# Patient Record
Sex: FEMALE | Race: WHITE
[De-identification: names, ages, dates, MRNs, and addresses within clinical notes are randomized per-mention and may not be internally consistent; named-entity substitution may affect disease eponyms.]

---

## 2018-10-27 ENCOUNTER — HOSPITAL ENCOUNTER (EMERGENCY)
Dept: HOSPITAL 62 - ER | Age: 28
LOS: 1 days | Discharge: HOME | End: 2018-10-28
Payer: SELF-PAY

## 2018-10-27 VITALS — SYSTOLIC BLOOD PRESSURE: 127 MMHG | DIASTOLIC BLOOD PRESSURE: 83 MMHG

## 2018-10-27 DIAGNOSIS — Z71.6: ICD-10-CM

## 2018-10-27 DIAGNOSIS — S92.352A: Primary | ICD-10-CM

## 2018-10-27 DIAGNOSIS — F17.210: ICD-10-CM
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
## 2018-10-27 PROCEDURE — 99406 BEHAV CHNG SMOKING 3-10 MIN: CPT

## 2018-10-27 PROCEDURE — 99283 EMERGENCY DEPT VISIT LOW MDM: CPT

## 2018-10-27 NOTE — ER DOCUMENT REPORT
ED Extremity Problem, Lower





- General


Chief Complaint: Ankle Injury


Stated Complaint: FOOT PAIN


Time Seen by Provider: 10/27/18 22:27


Mode of Arrival: Ambulatory


Information source: Patient


Notes: 





28-year-old female presents to ED for complaint of left foot pain.  She states 

she does not know exactly the day but about 1 or 2 months ago she rolled her 

foot.  She states she rolled her feet a lot but the pain is been for about 1 or 

2 months this time.  She states the pain is in the lateral aspect of her foot 

as well as the bottom of the great toe.  She states for the last 2 days the 

pain has been worse.  Patient is alert and oriented respirations regular and 

unlabored pulse is equal react light walks with a even steady gait.


TRAVEL OUTSIDE OF THE [place] IN LAST 30 DAYS: No





- HPI


Patient complains to provider of: Pain


Location: Foot - Left


Occurred: Other - 1 or 2 months ago


Onset/Duration: Intermittent


Quality of pain: Achy, Sharp


Severity: Mild


Pain Level: 2


Context: Other - Painful ambulation


Recent injury: No


Associated symptoms: Painful ambulation


Exacerbated by: Hanging down, Movement, Walking


Relieved by: Nothing





- Related Data


Allergies/Adverse Reactions: 


 





No Known Allergies Allergy (Verified 12/26/14 13:37)


 











Past Medical History





- General


Information source: Patient





- Social History


Smoking Status: Current Every Day Smoker


Cigarette use (# per day): Yes - 3 cigarettes a day


Chew tobacco use (# tins/day): No


Smoking Education Provided: Yes - 4 minutes


Frequency of alcohol use: None


Drug Abuse: None


Occupation: [employer]


Lives with: Spouse/Significant other


Family History: Arthritis, Malignancy


Patient has suicidal ideation: No


Patient has homicidal ideation: No





- Past Medical History


Cardiac Medical History: Reports: None


Pulmonary Medical History: Reports: Hx Bronchitis, Hx Pneumonia


EENT Medical History: Reports: None


Neurological Medical History: Reports: None


Endocrine Medical History: Reports: None


Renal/ Medical History: Reports: None


Malignancy Medical History: Reports: None


GI Medical History: Reports: None


Musculoskeletal Medical History: Reports Hx Musculoskeletal Trauma


Skin Medical History: Reports None


Psychiatric Medical History: Reports: None


Traumatic Medical History: Reports: Hx Fractures - boxer


Infectious Medical History: Reports: None


Surgical Hx: Negative


Past Surgical History: Reports: None





- Immunizations


Hx Diphtheria, Pertussis, Tetanus Vaccination: Yes





Review of Systems





- Review of Systems


Constitutional: No symptoms reported


EENT: No symptoms reported


Cardiovascular: No symptoms reported


Respiratory: No symptoms reported


Gastrointestinal: No symptoms reported


Genitourinary: No symptoms reported


Female Genitourinary: No symptoms reported


Musculoskeletal: Other - Left foot pain lateral aspect and great toe


Skin: No symptoms reported


Hematologic/Lymphatic: No symptoms reported


Neurological/Psychological: No symptoms reported


-: Yes All other systems reviewed and negative





Physical Exam





- Vital signs


Vitals: 


 











Temp Pulse Resp BP Pulse Ox


 


 98.4 F   72   16   127/83 H  97 


 


 10/27/18 22:01  10/27/18 22:01  10/27/18 22:01  10/27/18 22:01  10/27/18 22:01











Interpretation: Normal





- General


General appearance: Appears well, Alert





- HEENT


Head: Normocephalic, Atraumatic


Eyes: Normal


Pupils: PERRL





- Respiratory


Respiratory status: No respiratory distress


Chest status: Nontender


Breath sounds: Normal


Chest palpation: Normal





- Cardiovascular


Rhythm: Regular


Heart sounds: Normal auscultation


Murmur: No





- Abdominal


Inspection: Normal


Distension: No distension


Bowel sounds: Normal


Tenderness: Nontender


Organomegaly: No organomegaly





- Back


Back: Normal, Nontender





- Extremities


General upper extremity: Normal inspection, Nontender, Normal color, Normal ROM

, Normal temperature


General lower extremity: Normal inspection, Nontender, Normal color, Normal ROM

, Normal temperature, Normal weight bearing.  No: Lior's sign


Foot: No evidence of FB.  No: Tender, Abrasion, Deformity, Ecchymosis, Edema, 

Instability, Laceration, Metatarsal compress. pain, Nail injury, Navicular 

tenderness, Puncture wound, Tender 5th metatarsal, Unable to bear weight





- Neurological


Neuro grossly intact: Yes


Cognition: Normal


Orientation: AAOx4


[place] Coma Scale Eye Opening: Spontaneous


[place] Coma Scale Verbal: Oriented


[place] Coma Scale Motor: Obeys Commands


[place] Coma Scale Total: 15


Speech: Normal


Motor strength normal: LUE, RUE, LLE, RLE


Sensory: Normal





- Psychological


Associated symptoms: Normal affect, Normal mood





- Skin


Skin Temperature: Warm


Skin Moisture: Dry


Skin Color: Normal





Course





- Vital Signs


Vital signs: 


 











Temp Pulse Resp BP Pulse Ox


 


 98.4 F   72   16   127/83 H  97 


 


 10/27/18 22:01  10/27/18 22:01  10/27/18 22:01  10/27/18 22:01  10/27/18 22:01














- Diagnostic Test


Radiology reviewed: Image reviewed, Reports reviewed





Discharge





- Discharge


Clinical Impression: 


Fracture of 5th metatarsal


Qualifiers:


 Encounter type: initial encounter Fracture type: closed Physeal involvement: 

unspecified Laterality: left Qualified Code(s): S92.352A - Displaced fracture 

of fifth metatarsal bone, left foot, initial encounter for closed fracture





Condition: Stable


Disposition: HOME, SELF-CARE


Additional Instructions: 


Fracture





     You have a fracture.  The typical broken bone requires only protection and 

sufficient time for healing.  "Setting" is necessary only if the bones are 

crooked or out of position.  The physician will re-assess you periodically to 

make certain that the bone heals without complications.  It's important that 

you follow the instructions given you.


     The initial treatment is immobilization, elevation of the injury, and cold 

packs.  Not all fractures require a cast.  Depending on the location and type 

of fracture, immobilization may consist of a splint, cast, sling, bulky dressing

, or simply rest.


     The length of time required for healing depends on the location and type 

of fracture, and on the age of the patient.  The treatment plan the physician 

has outlined for you is customized to your fracture and health condition.


     Call the doctor or return at once if pain becomes severe, or if severe 

swelling or numbness develop.





ICE & ELEVATION:


     Apply ice packs frequently against the painful area.  Many different 

schedules are recommended, such as "20 minutes on, 20 minutes off" or "one hour 

ice, two hours rest."  If you need to work, you may need to go longer between 

ice treatments.  You should plan to have the area ice packed AT LEAST one-

fourth of the time.


     The ice should be applied over the wrap, tape, or splint, or over a layer 

of cloth -- not directly against the skin.  Some ice bags have a built-in cloth 

and can be put directly on the skin.


     Your injured part should be elevated as much as possible over the next 48 

hours.  Try to keep the injury above the level of the heart. Avoid use of the 

injured area.  Elevation and rest will decrease the swelling.








USE OF OVER-THE-COUNTER IBUPROFEN:


     Ibuprofen (Advil, Nuprin, Medipren, Motrin IB) is a medication for fever 

and pain control.  In addition, it has anti- inflammatory effects which may be 

beneficial, especially in the treatment of injuries.


     It's best to take ibuprofen with food.  Persons with ulcer disease or 

allergy to aspirin should notify their physician of this before taking 

ibuprofen.


     Ibuprofen can be given every four to six hours, for a total of four doses 

daily.


     Age              Pain or fever dose          Antiinflammatory dose


     6-8 yr              200 mg (1 tab)                200 mg (1 tab)


     9-11 yr             200 mg (1 tab)                200-400 mg (1-2 tab)


     11-14 yr            200-400 mg (1-2 tab)         400 mg (2 tab)


     15-adult            400 mg (2 tab)                600 mg (3 tab)








FOLLOW-UP CARE:


If you have been referred to a physician for follow-up care, call the physician

s office for an appointment as you were instructed or within the next two days.

  If you experience worsening or a significant change in your symptoms, notify 

the physician immediately or return to the Emergency Department at any time for 

re-evaluation.


Forms:  Elevated Blood Pressure, Smoking Cessation Education


Referrals: 


DARA GREGORY MD [ACTIVE STAFF] - Follow up as needed